# Patient Record
Sex: MALE | Race: WHITE | NOT HISPANIC OR LATINO | Employment: OTHER | URBAN - METROPOLITAN AREA
[De-identification: names, ages, dates, MRNs, and addresses within clinical notes are randomized per-mention and may not be internally consistent; named-entity substitution may affect disease eponyms.]

---

## 2020-01-09 ENCOUNTER — OFFICE VISIT (OUTPATIENT)
Dept: FAMILY MEDICINE | Facility: CLINIC | Age: 30
End: 2020-01-09
Payer: COMMERCIAL

## 2020-01-09 VITALS
HEART RATE: 76 BPM | TEMPERATURE: 98 F | WEIGHT: 193 LBS | BODY MASS INDEX: 26.14 KG/M2 | DIASTOLIC BLOOD PRESSURE: 84 MMHG | SYSTOLIC BLOOD PRESSURE: 138 MMHG | OXYGEN SATURATION: 99 % | HEIGHT: 72 IN

## 2020-01-09 DIAGNOSIS — M62.830 BACK MUSCLE SPASM: ICD-10-CM

## 2020-01-09 DIAGNOSIS — Z23 NEED FOR PROPHYLACTIC VACCINATION AND INOCULATION AGAINST INFLUENZA: ICD-10-CM

## 2020-01-09 DIAGNOSIS — M54.41 ACUTE RIGHT-SIDED LOW BACK PAIN WITH RIGHT-SIDED SCIATICA: Primary | ICD-10-CM

## 2020-01-09 PROCEDURE — 99203 OFFICE O/P NEW LOW 30 MIN: CPT | Mod: 25 | Performed by: NURSE PRACTITIONER

## 2020-01-09 PROCEDURE — 90686 IIV4 VACC NO PRSV 0.5 ML IM: CPT | Performed by: NURSE PRACTITIONER

## 2020-01-09 PROCEDURE — 90471 IMMUNIZATION ADMIN: CPT | Performed by: NURSE PRACTITIONER

## 2020-01-09 RX ORDER — METHYLPREDNISOLONE 4 MG
TABLET, DOSE PACK ORAL
Qty: 21 TABLET | Refills: 0 | Status: SHIPPED | OUTPATIENT
Start: 2020-01-09 | End: 2024-04-05

## 2020-01-09 RX ORDER — CYCLOBENZAPRINE HCL 5 MG
5-10 TABLET ORAL 3 TIMES DAILY PRN
Qty: 20 TABLET | Refills: 1 | Status: SHIPPED | OUTPATIENT
Start: 2020-01-09 | End: 2024-04-05

## 2020-01-09 ASSESSMENT — MIFFLIN-ST. JEOR: SCORE: 1863.44

## 2020-01-09 NOTE — PROGRESS NOTES
Subjective     Swapnil Angelo is a 30 year old male who presents to clinic today for the following health issues:    HPI   Back Pain       Duration: 3 weeks         Specific cause: none    Description:   Location of pain: low back right  Character of pain: sharp and dull   Pain radiation:radiates into the right buttocks and radiates into the right leg  New numbness or weakness in legs, not attributed to pain:  YES- sometimes in the heel     Intensity: starts off in the am, gets worse throughout the day     History:   Pain interferes with job: No  History of back problems: recurrent self limited episodes of low back pain in the past  Any previous MRI or X-rays: None  Sees a specialist for back pain:  No  Therapies tried without relief: stretching     Alleviating factors:   Improved by: NSAIDs  (somewhat)     Precipitating factors:  Worsened by: Lifting, Bending, Standing and Walking      Accompanying Signs & Symptoms:  Risk of Fracture:  None  Risk of Cauda Equina:  None  Risk of Infection:  None  Risk of Cancer:  None  Risk of Ankylosing Spondylitis:  Onset at age <35, male, AND morning back stiffness. no     HPI: Swapnil presents today with the complaint of low back pain. This has been present and ongoing for the past 3 weeks with no sign of improvement. No notable injury or overuse pattern. Constant but gets worse as the day goes on. Located in low back (right side) with radiation down into right buttocks and down back of right leg. Some tingling down right leg at times. No saddle anesthesia or limb weakness. No bowel or bladder dysfunction.     There is no problem list on file for this patient.    History reviewed. No pertinent surgical history.    Social History     Tobacco Use     Smoking status: Never Smoker     Smokeless tobacco: Never Used   Substance Use Topics     Alcohol use: Yes     Family History   Problem Relation Age of Onset     Cancer Father      Lung Cancer Maternal Grandmother             Reviewed and updated as needed this visit by Provider  Tobacco  Allergies  Meds  Problems  Med Hx  Surg Hx  Fam Hx  Soc Hx          Review of Systems   ROS COMP: Constitutional, musculoskeletal, neuro systems are negative, except as otherwise noted.      Objective    /84   Pulse 76   Temp 98  F (36.7  C) (Tympanic)   Ht 1.829 m (6')   Wt 87.5 kg (193 lb)   SpO2 99%   BMI 26.18 kg/m    Body mass index is 26.18 kg/m .  Physical Exam   GENERAL: healthy, alert and no distress  NEURO: Normal strength and tone, mentation intact and speech normal  BACK: Tender myofascial knots paraspinally in right lumbosacral region; No overlying heat, tenderness, swelling; No bony tenderness; No obvious malalignment. ROM intact but pain elicited with bending, twisting, and going from sitting to standing.     Diagnostic Test Results:  none         Assessment & Plan     Swapnil was seen today for back pain and imm/inj. No red flags. Imaging deferred today. Likely acute lumbosacral strain with muscle spasm and radicular symptoms. Will treat with alternation of ice and heat, ibuprofen, Flexeril, Medrol Dosepak, and gentle stretch / ROM (handout provided). If no improvement noted in 3-4 weeks, he will reach out and we can talk about sports medicine / medical spine referral +/- formal PT. Discussed reasons to call or return to clinic. Swapnil acknowledges and demonstrates understanding of circumstances under which care should be sought urgently or emergently. Follow up as discussed. Discussed risks, benefits, alternatives, potential side effects, and proper administration of new medication / treatment. Agrees with plan of care. All questions answered.     Diagnoses and all orders for this visit:    Acute right-sided low back pain with right-sided sciatica  -     methylPREDNISolone (MEDROL DOSEPAK) 4 MG tablet therapy pack; Follow Package Directions    Back muscle spasm  -     cyclobenzaprine (FLEXERIL) 5 MG tablet;  Take 1-2 tablets (5-10 mg) by mouth 3 times daily as needed for muscle spasms    Need for prophylactic vaccination and inoculation against influenza  -     INFLUENZA VACCINE IM > 6 MONTHS VALENT IIV4 [41794]  -     Vaccine Administration, Initial [45482]         BMI:   Estimated body mass index is 26.18 kg/m  as calculated from the following:    Height as of this encounter: 1.829 m (6').    Weight as of this encounter: 87.5 kg (193 lb).   Weight management plan: Discussed healthy diet and exercise guidelines        See Patient Instructions    Return in about 4 weeks (around 2/6/2020) for persistent or worsening symptoms.    Reji Henderson NP  [facility] [place]

## 2020-01-09 NOTE — PATIENT INSTRUCTIONS
Low back pain:  No need for imaging at this point (unless you have red flag symptoms).  Keep active, as able. Resting a sore back too much can delay recovery.  Ibuprofen for pain and inflammation. 800 mg twice a day.   Ice for pain. Heat to relieve spasm and tightness. Alternate ice and heat 3 times a day.   Flexeril (muscle relaxant). No driving.   Medrol Dosepak  Call or return to clinic if your symptoms persist for 6 weeks, or if they worsen.    Patient Education     Exercises to Strengthen Your Lower Back  Strong lower back and abdominal muscles work together to support your spine. The exercises below will help strengthen the lower back. It is important that you begin exercising slowly and increase levels gradually.  Always begin any exercise program with stretching. If you feel pain while doing any of these exercises, stop and talk to your doctor about a more specific exercise program that better suits your condition.   Low back stretch  The point of stretching is to make you more flexible and increase your range of motion. Stretch only as much as you are able. Stretch slowly. Do not push your stretch to the limit. If at any point you feel pain while stretching, this is your (temporary) limit.    Lie on your back with your knees bent and both feet on the ground.    Slowly raise your left knee to your chest as you flatten your lower back against the floor. Hold for 5 seconds.    Relax and repeat the exercise with your right knee.    Do 10 of these exercises for each leg.    Repeat hugging both knees to your chest at the same time.  Building lower back strength  Start your exercise routine with 10 to 30 minutes a day, 1 to 3 times a day.  Initial exercises  Lying on your back:  1. Ankle pumps: Move your foot up and down, towards your head, and then away. Repeat 10 times with each foot.  2. Heel slides: Slowly bend your knee, drawing the heel of your foot towards you. Then slide your heel/foot from you,  straightening your knee. Do not lift your foot off the floor (this is not a leg lift).  3. Abdominal contraction: Bend your knees and put your hands on your stomach. Tighten your stomach muscles. Hold for 5 seconds, then relax. Repeat 10 times.  4. Straight leg raise: Bend one leg at the knee and keep the other leg straight. Tighten your stomach muscles. Slowly lift your straight leg 6 to 12 inches off the floor and hold for up to 5 seconds. Repeat 10 times on each side.  Standin. Wall squats: Stand with your back against the wall. Move your feet about 12 inches away from the wall. Tighten your stomach muscles, and slowly bend your knees until they are at about a 45 degree angle. Do not go down too far. Hold about 5 seconds. Then slowly return to your starting position. Repeat 10 times.  2. Heel raises: Stand facing the wall. Slowly raise the heels of your feet up and down, while keeping your toes on the floor. If you have trouble balancing, you can touch the wall with your hands. Repeat 10 times.  More advanced exercises  When you feel comfortable enough, try these exercises.  1. Kneeling lumbar extension: Begin on your hands and knees. At the same time, raise and straighten your right arm and left leg until they are parallel to the ground. Hold for 2 seconds and come back slowly to a starting position. Repeat with left arm and right leg, alternating 10 times.  2. Prone lumbar extension: Lie face down, arms extended overhead, palms on the floor. At the same time, raise your right arm and left leg as high as comfortably possible. Hold for 10 seconds and slowly return to start. Repeat with left arm and right leg, alternating 10 times. Gradually build up to 20 times. (Advanced: Repeat this exercise raising both arms and both legs a few inches off the floor at the same time. Hold for 5 seconds and release.)  3. Pelvic tilt: Lie on the floor on your back with your knees bent at 90 degrees. Your feet should be flat  on the floor. Inhale, exhale, then slowly contract your abdominal muscles bringing your navel toward your spine. Let your pelvis rock back until your lower back is flat on the floor. Hold for 10 seconds while breathing smoothly.  4. Abdominal crunch: Perform a pelvic tilt (above) flattening your lower back against the floor. Holding the tension in your abdominal muscles, take another breath and raise your shoulder blades off the ground (this is not a full sit-up). Keep your head in line with your body (don t bend your neck forward). Hold for 2 seconds, then slowly lower.  Date Last Reviewed: 6/1/2016 2000-2019 [employer]. [address] [place] [address], [place] [address]. All rights reserved. This information is not intended as a substitute for professional medical care. Always follow your healthcare professional's instructions.           Patient Education     Back Care Tips    Caring for your back  These are things you can do to prevent a recurrence of acute back pain and to reduce symptoms from chronic back pain:    Stay at a healthy weight. If you are overweight, losing weight will help most types of back pain.    Exercise is an important part of recovery from most types of back pain. The muscles behind and in front of the spine support the back. This means strengthening both the back muscles and the abdominal muscles will provide better support for your spine.     Swimming and brisk walking are good overall exercises to improve your fitness level.    Practice safe lifting methods (see below).    Practice good posture when sitting, standing, and walking. Don't sit for a long time. This puts more stress on the lower back than standing or walking.    Wear quality shoes with good arch support. Foot and ankle alignment can affect back symptoms. Don't wear high heels.    Therapeutic massage can help relax the back muscles without stretching them.    During the first 24 to 72 hours after an acute injury or  flare-up of chronic back pain, put an ice pack on the painful area for 20 minutes and then remove it for 20 minutes. Do thisover a period of 60 to 90 minutes, or several times a day. As a safety precaution, don't use a heating pad at bedtime. Sleeping on a heating pad can lead to skin burns or tissue damage.    You can alternate using ice and heat.  Medicines  Talk with your healthcare provider before using medicines, especially if you have other health problems or are taking other medicines.    You may use over-the-counter medicines, such as acetaminophen, ibuprofen, or naprosyn to control pain, unless your healthcare provider prescribed other pain medicine. Talk with your healthcare provider before taking any medicines if you have a chronic condition such ass diabetes, liver or kidney disease, stomach ulcers, or digestive bleeding, or are taking blood thinners.    Be careful if you are given prescription pain medicines, opioids, or medicine for muscle spasm. They can cause drowsiness, and affect your coordination, reflexes, and judgment. Don't drive or operate heavy machinery while taking these types of medicines. Take prescription pain medicine only as prescribed by your healthcare provider.  Lumbar stretch  This simple stretch will help relax muscle spasm and keep your back more limber. If exercise makes your back pain worse, don t do it.    Lie on your back with your knees bent and both feet on the ground.    Slowly raise your left knee to your chest as you flatten your lower back against the floor. Hold for 5 seconds.    Relax and repeat the exercise with your right knee.    Do 10 of these exercises for each leg.  Safe lifting method    Don t bend over at the waist to lift an object off the floor.  Instead, bend your knees and hips in a squat.     Keep your back and head upright    Hold the object close to your body, directly in front of you.    Straighten your legs to lift the object.     Lower the object to  the floor in the reverse fashion.    If you must slide something across the floor, push it.    Posture tips  Sitting  Sit in chairs with straight backs or low-back support. Keep your knees lower than your hips, with your feet flat on the floor.  When driving, sit up straight. Adjust the seat forward so you are not leaning toward the steering wheel.  A small pillow or rolled towel behind your lower back may help if you are driving long distances.   Standing  When standing for long periods, shift most of your weight to one leg at a time. Switch legs every few minutes.   Sleeping  The best way to sleep is on your side with your knees bent. Put a low pillow under your head to support your neck in a neutral spine position. Don't use thick pillows that bend your neck to one side. Put a pillow between your legs to further relax your lower back. If you sleep on your back, put pillows under your knees to support your legs in a slightly flexed position. Use a firm mattress. If your mattress sags, replace it, or use a 1/2-inch plywood board under the mattress to add support.  Follow-up care  Follow up with your healthcare provider, or as advised.  If X-rays, a CT scan or an MRI scan were taken, they may be reviewed by a radiologist. You will be told of any new findings that may affect your care.  Call 911  Call 911 if any of the following occur:    Trouble breathing    Confusion    Very drowsy    Fainting or loss of consciousness    Rapid or very slow heart rate    Loss of  bowel or bladder control  When to seek medical advice  Call your healthcare provider right away if any of the following occur:    Pain becomes worse or spreads to your arms or legs    Weakness or numbness in one or both arms or legs    Numbness in the groin area  Date Last Reviewed: 6/1/2016 2000-2019 [employer]. [address] [facility], [place] [address]. All rights reserved. This information is not intended as a substitute for  professional medical care. Always follow your healthcare professional's instructions.

## 2020-03-16 ENCOUNTER — TELEPHONE (OUTPATIENT)
Dept: FAMILY MEDICINE | Facility: CLINIC | Age: 30
End: 2020-03-16

## 2020-03-16 DIAGNOSIS — M54.41 ACUTE RIGHT-SIDED LOW BACK PAIN WITH RIGHT-SIDED SCIATICA: Primary | ICD-10-CM

## 2020-03-16 NOTE — TELEPHONE ENCOUNTER
Thank you.     Please relate the following:    Sorry you're still having issues. At this point, I will go ahead and order a medical spine specialty referral (typically back pain episodes are nearly resolved by 6 weeks, so this has clearly extended beyond what is reasonable.     They should be calling him to set up a visit, but if they don't, here is the number:     All areas ~ [phone]    I will also order a PT referral, so if he wants to wait on the specialist appointment until he tries a month or so of PT, that would be fine, as well.     Here is the number to schedule with PT if he chooses to go that route.     Again, I'm sorry he's still struggling. Let me know if there is anything else I can do.     Thanks,  Timothy

## 2020-03-16 NOTE — TELEPHONE ENCOUNTER
Called patient and informed him of NP response below. Patient verbalized understanding and agrees with plan.   Referral information given to patient.     Mary Pineda RN, BSN  [facility] [place]

## 2020-03-16 NOTE — TELEPHONE ENCOUNTER
Reason for Call:   call back    Detailed comments: Pt has questions from his last appt. ZAKIYA/ [place] in Jan.  about his back - plz call back when poss.     Phone Number Patient can be reached at: Cell number on file:    Telephone Information:   Mobile [phone]       Best Time: any  Can we leave a detailed message on this number? Yes     Call taken on 3/16/2020 at 12:06 PM by Marycruz Gardner

## 2020-03-16 NOTE — TELEPHONE ENCOUNTER
Patient states about 25 % better. At night it gets pretty bad. Difficulty sleeping.     Patient has been stretching. Does seem to help. Also been walking.     Should he stop walking if he is feeling pain.     Taking ibuprofen 800 mg at dectime. Daytime, almost no pain. Worse as day goes on.     Feeling well otherwise. Should patient come in now or wait?   Brittany Sam RN

## 2020-04-13 ENCOUNTER — VIRTUAL VISIT (OUTPATIENT)
Dept: NEUROSURGERY | Facility: CLINIC | Age: 30
End: 2020-04-13
Attending: PHYSICIAN ASSISTANT
Payer: COMMERCIAL

## 2020-04-13 DIAGNOSIS — M54.50 CHRONIC RIGHT-SIDED LOW BACK PAIN WITHOUT SCIATICA: Primary | ICD-10-CM

## 2020-04-13 DIAGNOSIS — G89.29 CHRONIC RIGHT-SIDED LOW BACK PAIN WITHOUT SCIATICA: Primary | ICD-10-CM

## 2020-04-13 PROCEDURE — 99202 OFFICE O/P NEW SF 15 MIN: CPT | Mod: TEL | Performed by: PHYSICIAN ASSISTANT

## 2020-04-13 NOTE — PROGRESS NOTES
"Swapnil Angelo is a 30 year old male who is being evaluated via a billable telephone visit.      Due to COVID-19 this visit has been performed over the phone verses face to face.     The patient has been notified of following:     \"This telephone visit will be conducted via a call between you and your physician/provider. We have found that certain health care needs can be provided without the need for a physical exam.  This service lets us provide the care you need with a short phone conversation.  If a prescription is necessary we can send it directly to your pharmacy.  If lab work is needed we can place an order for that and you can then stop by our lab to have the test done at a later time.    If during the course of the call the physician/provider feels a telephone visit is not appropriate, you will not be charged for this service.\"     Swapnil Angelo complains of    Chief Complaint   Patient presents with     Neurologic Problem     Right sided low back pain with right sided sciatica        I have reviewed and updated the patient's Past Medical History, Social History, Family History and Medication List.    ALLERGIES  Patient has no known allergies.    Additional provider notes:    Swapnil Angelo is a 30 year old male who was referred to us by his primary care provider for evaluation of low back pain.  Patient states his back pain is been going on for about a year and he was initially concerned because it was not getting much better but now it has actually been improving significantly over the past 4 weeks, which is been released for him.  He reports it is mostly in his right low back occasionally into his right buttock.  He does not know of any particular injury that he feels brought in on for perhaps thinks may have started when he is playing golf.  He denies any weakness or numbness in his lower extremities denies any bowel or bladder issues.    Assessment    Right-sided low back pain    Plan:    I " reassured the patient that his symptoms sound entirely benign at this time and that he can certainly resume his usual activities without restrictions and some bruising to his body on how he does.  If he were to develop any concerning symptoms such as weakness numbness tingling or pain going down his legs would certainly be happy to see him again otherwise at this time no further follow-up is needed with neurosurgery clinic.    Phone call duration: 8 minutes  Start Time 125pm  End Time 133pm    Shayna Hector PA-C    Patient provided verbal consent for the phone visit today.

## 2021-01-04 ENCOUNTER — HEALTH MAINTENANCE LETTER (OUTPATIENT)
Age: 31
End: 2021-01-04

## 2021-10-10 ENCOUNTER — HEALTH MAINTENANCE LETTER (OUTPATIENT)
Age: 31
End: 2021-10-10

## 2022-01-30 ENCOUNTER — HEALTH MAINTENANCE LETTER (OUTPATIENT)
Age: 32
End: 2022-01-30

## 2022-09-18 ENCOUNTER — HEALTH MAINTENANCE LETTER (OUTPATIENT)
Age: 32
End: 2022-09-18

## 2023-05-07 ENCOUNTER — HEALTH MAINTENANCE LETTER (OUTPATIENT)
Age: 33
End: 2023-05-07

## 2024-01-10 ENCOUNTER — VIRTUAL VISIT (OUTPATIENT)
Dept: FAMILY MEDICINE | Facility: CLINIC | Age: 34
End: 2024-01-10
Payer: COMMERCIAL

## 2024-01-10 DIAGNOSIS — R41.840 INATTENTION: Primary | ICD-10-CM

## 2024-01-10 PROCEDURE — 99203 OFFICE O/P NEW LOW 30 MIN: CPT | Mod: 95 | Performed by: FAMILY MEDICINE

## 2024-01-10 NOTE — PROGRESS NOTES
Swapnil is a 34 year old who is being evaluated via a billable video visit.      How would you like to obtain your AVS? MyChart  If the video visit is dropped, the invitation should be resent by: Text to cell phone: [phone]  Will anyone else be joining your video visit? No          Assessment & Plan     Inattention  Suggested patient there is a good possibility this could be underlying ADD.  I suggested for adult diagnosis we need to have a proper evaluation and diagnosis I will refer him to mental health for possible testing and once that is done if it is indicate any such issues which can follow-up and discuss that.  Patient agreed with the plan.  - REVIEW OF HEALTH MAINTENANCE PROTOCOL ORDERS  - Adult Mental Health  Referral; Future      Shaun Aguilar MD  [facility]    Inga Kraft is a 34 year old, presenting for the following health issues:  Consult  Patient is a pleasant 34-year-old male who made this appointment to discuss some issues he is having symptoms of inattention and inability to to finish task.  As per patient he has always had this issue but never seek any treatment or evaluation.  He was able to finish college but he did struggle in some subjects.  Never been formally diagnosed with any ADHD or ADD.  He would like to get a referral for testing and then follow-up if medications are necessary.      1/10/2024     1:48 PM   Additional Questions   Roomed by Carmen   Accompanied by Not applicable, by themselves       History of Present Illness       Reason for visit:  I would like to be tested for ADD. I'm struggling with some sy,ptoms that have been getting worse.    He eats 0-1 servings of fruits and vegetables daily.He consumes 2 sweetened beverage(s) daily.He exercises with enough effort to increase his heart rate 10 to 19 minutes per day.  He exercises with enough effort to increase his heart rate 3 or less days per week.   He is taking medications  regularly.           Review of Systems   Constitutional, HEENT, cardiovascular, pulmonary, gi and gu systems are negative, except as otherwise noted.      Objective           Vitals:  No vitals were obtained today due to virtual visit.    Physical Exam   GENERAL: Healthy, alert and no distress  EYES: Eyes grossly normal to inspection.  No discharge or erythema, or obvious scleral/conjunctival abnormalities.  RESP: No audible wheeze, cough, or visible cyanosis.  No visible retractions or increased work of breathing.    SKIN: Visible skin clear. No significant rash, abnormal pigmentation or lesions.  NEURO: Cranial nerves grossly intact.  Mentation and speech appropriate for age.  PSYCH: Mentation appears normal, affect normal/bright, judgement and insight intact, normal speech and appearance well-groomed.                Video-Visit Details    Type of service:  Video Visit     Originating Location (pt. Location): Home    Distant Location (provider location):  On-site  Platform used for Video Visit: [employer] Mobile

## 2024-02-07 ENCOUNTER — TRANSFERRED RECORDS (OUTPATIENT)
Dept: HEALTH INFORMATION MANAGEMENT | Facility: CLINIC | Age: 34
End: 2024-02-07
Payer: COMMERCIAL

## 2024-02-27 ENCOUNTER — MYC MEDICAL ADVICE (OUTPATIENT)
Dept: FAMILY MEDICINE | Facility: CLINIC | Age: 34
End: 2024-02-27
Payer: COMMERCIAL

## 2024-02-27 NOTE — TELEPHONE ENCOUNTER
Please help patient's schedule an in person evaluation and review of his evaluation in the next 1 to 2 weeks.  Okay to use same-day slot.

## 2024-02-28 NOTE — TELEPHONE ENCOUNTER
Call attempt #1.       Left message on secure voicemail.  Patient told to call and schedule appointment with Dr. Aguilar. If they are unable to help him secure appointment in two weeks, he should ask to be transferred to a triage nurse.       Megan Luna RN  [facility]

## 2024-03-05 ENCOUNTER — OFFICE VISIT (OUTPATIENT)
Dept: FAMILY MEDICINE | Facility: CLINIC | Age: 34
End: 2024-03-05
Payer: COMMERCIAL

## 2024-03-05 VITALS
DIASTOLIC BLOOD PRESSURE: 84 MMHG | OXYGEN SATURATION: 98 % | HEART RATE: 84 BPM | RESPIRATION RATE: 12 BRPM | BODY MASS INDEX: 27.12 KG/M2 | TEMPERATURE: 99.1 F | SYSTOLIC BLOOD PRESSURE: 120 MMHG | WEIGHT: 200 LBS

## 2024-03-05 DIAGNOSIS — F90.9 ATTENTION DEFICIT HYPERACTIVITY DISORDER (ADHD), UNSPECIFIED ADHD TYPE: Primary | ICD-10-CM

## 2024-03-05 PROCEDURE — 99214 OFFICE O/P EST MOD 30 MIN: CPT | Performed by: FAMILY MEDICINE

## 2024-03-05 PROCEDURE — 80053 COMPREHEN METABOLIC PANEL: CPT | Performed by: FAMILY MEDICINE

## 2024-03-05 PROCEDURE — 36415 COLL VENOUS BLD VENIPUNCTURE: CPT | Performed by: FAMILY MEDICINE

## 2024-03-05 RX ORDER — DEXTROAMPHETAMINE SACCHARATE, AMPHETAMINE ASPARTATE MONOHYDRATE, DEXTROAMPHETAMINE SULFATE AND AMPHETAMINE SULFATE 2.5; 2.5; 2.5; 2.5 MG/1; MG/1; MG/1; MG/1
10 CAPSULE, EXTENDED RELEASE ORAL DAILY
Qty: 30 CAPSULE | Refills: 0 | Status: SHIPPED | OUTPATIENT
Start: 2024-03-05 | End: 2024-04-05

## 2024-03-05 ASSESSMENT — PAIN SCALES - GENERAL: PAINLEVEL: NO PAIN (0)

## 2024-03-05 NOTE — PROGRESS NOTES
Assessment & Plan     Attention deficit hyperactivity disorder (ADHD), unspecified ADHD type  Discussed with the patient about the diagnosis.  Treatment is often with medication which is controlled substance.  Side effects and long-term effects were discussed with the patient.  After careful discussion we decided to start on a low-dose of Adderall.  Side effects including sleep, palpitations and other issues were discussed.  He will take this medication as prescribed advised not to take later part of the day.  Follow-up in 4 weeks for reevaluation.    - Comprehensive metabolic panel (BMP + Alb, Alk Phos, ALT, AST, Total. Bili, TP); Future  - amphetamine-dextroamphetamine (ADDERALL XR) 10 MG 24 hr capsule; Take 1 capsule (10 mg) by mouth daily  - Comprehensive metabolic panel (BMP + Alb, Alk Phos, ALT, AST, Total. Bili, TP)                Inga Kraft is a 34 year old, presenting for the following health issues:  Consult (ADHD)  Patient came today for evaluation of ADHD.  He recently have evaluation done which indicates he have mild to moderate ADHD symptoms.  He has been having issues regarding inattention, pain attention to the details is able to finish work has always been a challenging.  He has never been on any medication.  Childhood was somewhat similar in terms of finishing task.  However he never was formally diagnosed or given any medication for that.      3/5/2024    12:45 PM   Additional Questions   Roomed by Dennis WREN     History of Present Illness       Reason for visit:  ADHD diagnosis follow-up    He eats 2-3 servings of fruits and vegetables daily.He consumes 1 sweetened beverage(s) daily.He exercises with enough effort to increase his heart rate 10 to 19 minutes per day.  He exercises with enough effort to increase his heart rate 3 or less days per week.   He is taking medications regularly.             Review of Systems  Constitutional, HEENT, cardiovascular, pulmonary, gi and gu systems are  negative, except as otherwise noted.      Objective    /84   Pulse 84   Temp 99.1  F (37.3  C) (Tympanic)   Resp 12   Wt 90.7 kg (200 lb)   SpO2 98%   BMI 27.12 kg/m    Body mass index is 27.12 kg/m .  Physical Exam   GENERAL: alert and no distress  NECK: no adenopathy, no asymmetry, masses, or scars  RESP: lungs clear to auscultation - no rales, rhonchi or wheezes  CV: regular rate and rhythm, normal S1 S2, no S3 or S4, no murmur, click or rub, no peripheral edema  ABDOMEN: soft, nontender, no hepatosplenomegaly, no masses and bowel sounds normal  MS: no gross musculoskeletal defects noted, no edema          Signed Electronically by: Shaun Aguilar MD

## 2024-03-06 LAB
ALBUMIN SERPL BCG-MCNC: 5 G/DL (ref 3.5–5.2)
ALP SERPL-CCNC: 64 U/L (ref 40–150)
ALT SERPL W P-5'-P-CCNC: 23 U/L (ref 0–70)
ANION GAP SERPL CALCULATED.3IONS-SCNC: 13 MMOL/L (ref 7–15)
AST SERPL W P-5'-P-CCNC: 33 U/L (ref 0–45)
BILIRUB SERPL-MCNC: 1.9 MG/DL
BUN SERPL-MCNC: 11.3 MG/DL (ref 6–20)
CALCIUM SERPL-MCNC: 9.9 MG/DL (ref 8.6–10)
CHLORIDE SERPL-SCNC: 103 MMOL/L (ref 98–107)
CREAT SERPL-MCNC: 0.88 MG/DL (ref 0.67–1.17)
DEPRECATED HCO3 PLAS-SCNC: 25 MMOL/L (ref 22–29)
EGFRCR SERPLBLD CKD-EPI 2021: >90 ML/MIN/1.73M2
GLUCOSE SERPL-MCNC: 106 MG/DL (ref 70–99)
POTASSIUM SERPL-SCNC: 4.5 MMOL/L (ref 3.4–5.3)
PROT SERPL-MCNC: 7.7 G/DL (ref 6.4–8.3)
SODIUM SERPL-SCNC: 141 MMOL/L (ref 135–145)

## 2024-04-05 ENCOUNTER — VIRTUAL VISIT (OUTPATIENT)
Dept: FAMILY MEDICINE | Facility: CLINIC | Age: 34
End: 2024-04-05
Payer: COMMERCIAL

## 2024-04-05 DIAGNOSIS — F90.9 ATTENTION DEFICIT HYPERACTIVITY DISORDER (ADHD), UNSPECIFIED ADHD TYPE: ICD-10-CM

## 2024-04-05 PROCEDURE — 99213 OFFICE O/P EST LOW 20 MIN: CPT | Mod: 95 | Performed by: FAMILY MEDICINE

## 2024-04-05 RX ORDER — DEXTROAMPHETAMINE SACCHARATE, AMPHETAMINE ASPARTATE MONOHYDRATE, DEXTROAMPHETAMINE SULFATE AND AMPHETAMINE SULFATE 2.5; 2.5; 2.5; 2.5 MG/1; MG/1; MG/1; MG/1
10 CAPSULE, EXTENDED RELEASE ORAL DAILY
Qty: 30 CAPSULE | Refills: 0 | Status: SHIPPED | OUTPATIENT
Start: 2024-04-05 | End: 2024-05-08

## 2024-04-05 NOTE — PROGRESS NOTES
Swapnil is a 34 year old who is being evaluated via a billable video visit.    How would you like to obtain your AVS? MyChart  If the video visit is dropped, the invitation should be resent by: Text to cell phone: [phone]  Will anyone else be joining your video visit? No      Assessment & Plan     Attention deficit hyperactivity disorder (ADHD), unspecified ADHD type  Follow up in 6 months, refill protocol dicussed with the patient.  - amphetamine-dextroamphetamine (ADDERALL XR) 10 MG 24 hr capsule; Take 1 capsule (10 mg) by mouth daily              Subjective   Swapnil is a 34 year old, presenting for the following health issues:  Recheck Medication and Refill Request  Patient is for follow-up on ADHD symptoms.  Since starting Adderall he noticed significant improvement in his inattention.  He is able to finish tasks.  No current side effects he would like to get her medication refilled.  History of Present Illness       Reason for visit:  Check-in, prescription refill    He eats 2-3 servings of fruits and vegetables daily.He consumes 2 sweetened beverage(s) daily.He exercises with enough effort to increase his heart rate 30 to 60 minutes per day.  He exercises with enough effort to increase his heart rate 3 or less days per week. He is missing 2 dose(s) of medications per week.  He is not taking prescribed medications regularly due to other.           Review of Systems  Constitutional, HEENT, cardiovascular, pulmonary, gi and gu systems are negative, except as otherwise noted.      Objective    Vitals - Patient Reported  Weight (Patient Reported): 90.7 kg (200 lb)  Height (Patient Reported): 182.9 cm (6')  BMI (Based on Pt Reported Ht/Wt): 27.12        Physical Exam   GENERAL: alert and no distress  EYES: Eyes grossly normal to inspection.  No discharge or erythema, or obvious scleral/conjunctival abnormalities.  RESP: No audible wheeze, cough, or visible cyanosis.    SKIN: Visible skin clear. No significant  rash, abnormal pigmentation or lesions.  NEURO: Cranial nerves grossly intact.  Mentation and speech appropriate for age.  PSYCH: Appropriate affect, tone, and pace of words          Video-Visit Details    Type of service:  Video Visit   Originating Location (pt. Location): Home    Distant Location (provider location):  On-site  Platform used for Video Visit: Parveen  Signed Electronically by: Shaun Aguilar MD

## 2024-05-08 ENCOUNTER — MYC REFILL (OUTPATIENT)
Dept: FAMILY MEDICINE | Facility: CLINIC | Age: 34
End: 2024-05-08
Payer: COMMERCIAL

## 2024-05-08 DIAGNOSIS — F90.9 ATTENTION DEFICIT HYPERACTIVITY DISORDER (ADHD), UNSPECIFIED ADHD TYPE: ICD-10-CM

## 2024-05-08 RX ORDER — DEXTROAMPHETAMINE SACCHARATE, AMPHETAMINE ASPARTATE MONOHYDRATE, DEXTROAMPHETAMINE SULFATE AND AMPHETAMINE SULFATE 2.5; 2.5; 2.5; 2.5 MG/1; MG/1; MG/1; MG/1
10 CAPSULE, EXTENDED RELEASE ORAL DAILY
Qty: 30 CAPSULE | Refills: 0 | Status: SHIPPED | OUTPATIENT
Start: 2024-05-08 | End: 2024-06-05

## 2024-06-05 ENCOUNTER — MYC REFILL (OUTPATIENT)
Dept: FAMILY MEDICINE | Facility: CLINIC | Age: 34
End: 2024-06-05
Payer: COMMERCIAL

## 2024-06-05 DIAGNOSIS — F90.9 ATTENTION DEFICIT HYPERACTIVITY DISORDER (ADHD), UNSPECIFIED ADHD TYPE: ICD-10-CM

## 2024-06-05 RX ORDER — DEXTROAMPHETAMINE SACCHARATE, AMPHETAMINE ASPARTATE MONOHYDRATE, DEXTROAMPHETAMINE SULFATE AND AMPHETAMINE SULFATE 2.5; 2.5; 2.5; 2.5 MG/1; MG/1; MG/1; MG/1
10 CAPSULE, EXTENDED RELEASE ORAL DAILY
Qty: 30 CAPSULE | Refills: 0 | Status: SHIPPED | OUTPATIENT
Start: 2024-06-05 | End: 2024-07-03

## 2024-07-03 ENCOUNTER — MYC REFILL (OUTPATIENT)
Dept: FAMILY MEDICINE | Facility: CLINIC | Age: 34
End: 2024-07-03
Payer: COMMERCIAL

## 2024-07-03 DIAGNOSIS — F90.9 ATTENTION DEFICIT HYPERACTIVITY DISORDER (ADHD), UNSPECIFIED ADHD TYPE: ICD-10-CM

## 2024-07-03 RX ORDER — DEXTROAMPHETAMINE SACCHARATE, AMPHETAMINE ASPARTATE MONOHYDRATE, DEXTROAMPHETAMINE SULFATE AND AMPHETAMINE SULFATE 2.5; 2.5; 2.5; 2.5 MG/1; MG/1; MG/1; MG/1
10 CAPSULE, EXTENDED RELEASE ORAL DAILY
Qty: 30 CAPSULE | Refills: 0 | Status: SHIPPED | OUTPATIENT
Start: 2024-07-03 | End: 2024-08-05

## 2024-07-14 ENCOUNTER — HEALTH MAINTENANCE LETTER (OUTPATIENT)
Age: 34
End: 2024-07-14

## 2024-08-05 ENCOUNTER — MYC REFILL (OUTPATIENT)
Dept: FAMILY MEDICINE | Facility: CLINIC | Age: 34
End: 2024-08-05
Payer: COMMERCIAL

## 2024-08-05 DIAGNOSIS — F90.9 ATTENTION DEFICIT HYPERACTIVITY DISORDER (ADHD), UNSPECIFIED ADHD TYPE: ICD-10-CM

## 2024-08-06 RX ORDER — DEXTROAMPHETAMINE SACCHARATE, AMPHETAMINE ASPARTATE MONOHYDRATE, DEXTROAMPHETAMINE SULFATE AND AMPHETAMINE SULFATE 2.5; 2.5; 2.5; 2.5 MG/1; MG/1; MG/1; MG/1
10 CAPSULE, EXTENDED RELEASE ORAL DAILY
Qty: 30 CAPSULE | Refills: 0 | Status: SHIPPED | OUTPATIENT
Start: 2024-08-06 | End: 2024-09-05

## 2024-09-05 ENCOUNTER — MYC REFILL (OUTPATIENT)
Dept: FAMILY MEDICINE | Facility: CLINIC | Age: 34
End: 2024-09-05
Payer: COMMERCIAL

## 2024-09-05 DIAGNOSIS — F90.9 ATTENTION DEFICIT HYPERACTIVITY DISORDER (ADHD), UNSPECIFIED ADHD TYPE: ICD-10-CM

## 2024-09-05 RX ORDER — DEXTROAMPHETAMINE SACCHARATE, AMPHETAMINE ASPARTATE MONOHYDRATE, DEXTROAMPHETAMINE SULFATE AND AMPHETAMINE SULFATE 2.5; 2.5; 2.5; 2.5 MG/1; MG/1; MG/1; MG/1
10 CAPSULE, EXTENDED RELEASE ORAL DAILY
Qty: 30 CAPSULE | Refills: 0 | Status: SHIPPED | OUTPATIENT
Start: 2024-09-05 | End: 2024-10-03

## 2024-09-12 ENCOUNTER — VIRTUAL VISIT (OUTPATIENT)
Dept: FAMILY MEDICINE | Facility: CLINIC | Age: 34
End: 2024-09-12
Payer: COMMERCIAL

## 2024-09-12 DIAGNOSIS — R41.840 INATTENTION: ICD-10-CM

## 2024-09-12 DIAGNOSIS — F90.9 ATTENTION DEFICIT HYPERACTIVITY DISORDER (ADHD), UNSPECIFIED ADHD TYPE: Primary | ICD-10-CM

## 2024-09-12 PROCEDURE — G2211 COMPLEX E/M VISIT ADD ON: HCPCS | Mod: 95 | Performed by: FAMILY MEDICINE

## 2024-09-12 PROCEDURE — 99213 OFFICE O/P EST LOW 20 MIN: CPT | Mod: 95 | Performed by: FAMILY MEDICINE

## 2024-09-12 NOTE — PROGRESS NOTES
Swapnil is a 34 year old who is being evaluated via a billable video visit.    How would you like to obtain your AVS? MyChart  If the video visit is dropped, the invitation should be resent by: Send to e-mail at: [email or web address]  Will anyone else be joining your video visit? No      Assessment & Plan     Attention deficit hyperactivity disorder (ADHD), unspecified ADHD type  Is overall feeling better first dose.  Would like to continue that side effect profile discussed.  He thinks it may wear off in the end of the day however still feel he would like to continue same dose.  Follow-up 6 months in person.    Inattention      The longitudinal plan of care for the diagnosis(es)/condition(s) as documented were addressed during this visit. Due to the added complexity in care, I will continue to support Swapnil in the subsequent management and with ongoing continuity of care.              Subjective   Swapnil is a 34 year old, presenting for the following health issues:  Recheck Medication        9/12/2024    12:48 PM   Additional Questions   Roomed by Danay     History of Present Illness       Reason for visit:  Follow up visit    He eats 2-3 servings of fruits and vegetables daily.He consumes 1 sweetened beverage(s) daily.He exercises with enough effort to increase his heart rate 30 to 60 minutes per day.  He exercises with enough effort to increase his heart rate 5 days per week.        Medication Followup of Adderall Xr 10 MG  Taking Medication as prescribed: yes  Side Effects:  None  Medication Helping Symptoms:  yes        Review of Systems  Constitutional, HEENT, cardiovascular, pulmonary, gi and gu systems are negative, except as otherwise noted.      Objective    Vitals - Patient Reported  Pain Score: No Pain (0)      Vitals:  No vitals were obtained today due to virtual visit.    Physical Exam   GENERAL: alert and no distress  EYES: Eyes grossly normal to inspection.  No discharge or erythema, or obvious  scleral/conjunctival abnormalities.  RESP: No audible wheeze, cough, or visible cyanosis.    SKIN: Visible skin clear. No significant rash, abnormal pigmentation or lesions.  NEURO: Cranial nerves grossly intact.  Mentation and speech appropriate for age.  PSYCH: Appropriate affect, tone, and pace of words          Video-Visit Details    Type of service:  Video Visit   Originating Location (pt. Location): Home    Distant Location (provider location):  On-site  Platform used for Video Visit: Parveen  Signed Electronically by: Shaun Aguilar MD

## 2024-10-03 ENCOUNTER — MYC REFILL (OUTPATIENT)
Dept: FAMILY MEDICINE | Facility: CLINIC | Age: 34
End: 2024-10-03
Payer: COMMERCIAL

## 2024-10-03 DIAGNOSIS — F90.9 ATTENTION DEFICIT HYPERACTIVITY DISORDER (ADHD), UNSPECIFIED ADHD TYPE: ICD-10-CM

## 2024-10-03 RX ORDER — DEXTROAMPHETAMINE SACCHARATE, AMPHETAMINE ASPARTATE MONOHYDRATE, DEXTROAMPHETAMINE SULFATE AND AMPHETAMINE SULFATE 2.5; 2.5; 2.5; 2.5 MG/1; MG/1; MG/1; MG/1
10 CAPSULE, EXTENDED RELEASE ORAL DAILY
Qty: 30 CAPSULE | Refills: 0 | Status: SHIPPED | OUTPATIENT
Start: 2024-10-03 | End: 2024-11-06

## 2024-11-06 ENCOUNTER — MYC REFILL (OUTPATIENT)
Dept: FAMILY MEDICINE | Facility: CLINIC | Age: 34
End: 2024-11-06
Payer: COMMERCIAL

## 2024-11-06 DIAGNOSIS — F90.9 ATTENTION DEFICIT HYPERACTIVITY DISORDER (ADHD), UNSPECIFIED ADHD TYPE: ICD-10-CM

## 2024-11-06 RX ORDER — DEXTROAMPHETAMINE SACCHARATE, AMPHETAMINE ASPARTATE MONOHYDRATE, DEXTROAMPHETAMINE SULFATE AND AMPHETAMINE SULFATE 2.5; 2.5; 2.5; 2.5 MG/1; MG/1; MG/1; MG/1
10 CAPSULE, EXTENDED RELEASE ORAL DAILY
Qty: 30 CAPSULE | Refills: 0 | Status: SHIPPED | OUTPATIENT
Start: 2024-11-06

## 2024-12-05 ENCOUNTER — MYC REFILL (OUTPATIENT)
Dept: FAMILY MEDICINE | Facility: CLINIC | Age: 34
End: 2024-12-05
Payer: COMMERCIAL

## 2024-12-05 DIAGNOSIS — F90.9 ATTENTION DEFICIT HYPERACTIVITY DISORDER (ADHD), UNSPECIFIED ADHD TYPE: ICD-10-CM

## 2024-12-05 RX ORDER — DEXTROAMPHETAMINE SACCHARATE, AMPHETAMINE ASPARTATE MONOHYDRATE, DEXTROAMPHETAMINE SULFATE AND AMPHETAMINE SULFATE 2.5; 2.5; 2.5; 2.5 MG/1; MG/1; MG/1; MG/1
10 CAPSULE, EXTENDED RELEASE ORAL DAILY
Qty: 30 CAPSULE | Refills: 0 | Status: SHIPPED | OUTPATIENT
Start: 2024-12-05

## 2025-01-06 ENCOUNTER — MYC REFILL (OUTPATIENT)
Dept: FAMILY MEDICINE | Facility: CLINIC | Age: 35
End: 2025-01-06
Payer: COMMERCIAL

## 2025-01-06 DIAGNOSIS — F90.9 ATTENTION DEFICIT HYPERACTIVITY DISORDER (ADHD), UNSPECIFIED ADHD TYPE: ICD-10-CM

## 2025-01-06 RX ORDER — DEXTROAMPHETAMINE SACCHARATE, AMPHETAMINE ASPARTATE MONOHYDRATE, DEXTROAMPHETAMINE SULFATE AND AMPHETAMINE SULFATE 2.5; 2.5; 2.5; 2.5 MG/1; MG/1; MG/1; MG/1
10 CAPSULE, EXTENDED RELEASE ORAL DAILY
Qty: 30 CAPSULE | Refills: 0 | Status: SHIPPED | OUTPATIENT
Start: 2025-01-06

## 2025-02-05 ENCOUNTER — MYC REFILL (OUTPATIENT)
Dept: FAMILY MEDICINE | Facility: CLINIC | Age: 35
End: 2025-02-05
Payer: COMMERCIAL

## 2025-02-05 DIAGNOSIS — F90.9 ATTENTION DEFICIT HYPERACTIVITY DISORDER (ADHD), UNSPECIFIED ADHD TYPE: ICD-10-CM

## 2025-02-05 RX ORDER — DEXTROAMPHETAMINE SACCHARATE, AMPHETAMINE ASPARTATE MONOHYDRATE, DEXTROAMPHETAMINE SULFATE AND AMPHETAMINE SULFATE 2.5; 2.5; 2.5; 2.5 MG/1; MG/1; MG/1; MG/1
10 CAPSULE, EXTENDED RELEASE ORAL DAILY
Qty: 30 CAPSULE | Refills: 0 | Status: SHIPPED | OUTPATIENT
Start: 2025-02-05

## 2025-03-05 ENCOUNTER — MYC REFILL (OUTPATIENT)
Dept: FAMILY MEDICINE | Facility: CLINIC | Age: 35
End: 2025-03-05
Payer: COMMERCIAL

## 2025-03-05 DIAGNOSIS — F90.9 ATTENTION DEFICIT HYPERACTIVITY DISORDER (ADHD), UNSPECIFIED ADHD TYPE: ICD-10-CM

## 2025-03-05 RX ORDER — DEXTROAMPHETAMINE SACCHARATE, AMPHETAMINE ASPARTATE MONOHYDRATE, DEXTROAMPHETAMINE SULFATE AND AMPHETAMINE SULFATE 2.5; 2.5; 2.5; 2.5 MG/1; MG/1; MG/1; MG/1
10 CAPSULE, EXTENDED RELEASE ORAL DAILY
Qty: 30 CAPSULE | Refills: 0 | Status: SHIPPED | OUTPATIENT
Start: 2025-03-05

## 2025-04-07 ENCOUNTER — MYC REFILL (OUTPATIENT)
Dept: FAMILY MEDICINE | Facility: CLINIC | Age: 35
End: 2025-04-07
Payer: COMMERCIAL

## 2025-04-07 DIAGNOSIS — F90.9 ATTENTION DEFICIT HYPERACTIVITY DISORDER (ADHD), UNSPECIFIED ADHD TYPE: ICD-10-CM

## 2025-04-07 RX ORDER — DEXTROAMPHETAMINE SACCHARATE, AMPHETAMINE ASPARTATE MONOHYDRATE, DEXTROAMPHETAMINE SULFATE AND AMPHETAMINE SULFATE 2.5; 2.5; 2.5; 2.5 MG/1; MG/1; MG/1; MG/1
10 CAPSULE, EXTENDED RELEASE ORAL DAILY
Qty: 30 CAPSULE | Refills: 0 | Status: SHIPPED | OUTPATIENT
Start: 2025-04-07

## 2025-04-24 ENCOUNTER — OFFICE VISIT (OUTPATIENT)
Dept: FAMILY MEDICINE | Facility: CLINIC | Age: 35
End: 2025-04-24
Payer: COMMERCIAL

## 2025-04-24 VITALS
TEMPERATURE: 97.2 F | WEIGHT: 197 LBS | HEIGHT: 72 IN | HEART RATE: 72 BPM | SYSTOLIC BLOOD PRESSURE: 110 MMHG | BODY MASS INDEX: 26.68 KG/M2 | RESPIRATION RATE: 15 BRPM | OXYGEN SATURATION: 99 % | DIASTOLIC BLOOD PRESSURE: 60 MMHG

## 2025-04-24 DIAGNOSIS — Z13.220 LIPID SCREENING: ICD-10-CM

## 2025-04-24 DIAGNOSIS — F90.9 ATTENTION DEFICIT HYPERACTIVITY DISORDER (ADHD), UNSPECIFIED ADHD TYPE: Primary | ICD-10-CM

## 2025-04-24 RX ORDER — DEXTROAMPHETAMINE SACCHARATE, AMPHETAMINE ASPARTATE MONOHYDRATE, DEXTROAMPHETAMINE SULFATE AND AMPHETAMINE SULFATE 3.75; 3.75; 3.75; 3.75 MG/1; MG/1; MG/1; MG/1
15 CAPSULE, EXTENDED RELEASE ORAL DAILY
Qty: 30 CAPSULE | Refills: 0 | Status: SHIPPED | OUTPATIENT
Start: 2025-05-05

## 2025-04-24 ASSESSMENT — PAIN SCALES - GENERAL: PAINLEVEL_OUTOF10: NO PAIN (0)

## 2025-04-24 NOTE — PROGRESS NOTES
"  Assessment & Plan     Attention deficit hyperactivity disorder (ADHD), unspecified ADHD type  Suggested patient to increase the dose of Adderall to 15 mg extended release prescription provided.  Follow-up 6 months for recheck via virtual visit side effects were discussed with the patient. He denies nay palpitations or sleep issues  - PRIMARY CARE FOLLOW-UP SCHEDULING; Future  - amphetamine-dextroamphetamine (ADDERALL XR) 15 MG 24 hr capsule; Take 1 capsule (15 mg) by mouth daily.    Lipid screening    - Comprehensive metabolic panel; Future  - Lipid panel reflex to direct LDL Fasting; Future  - Comprehensive metabolic panel  - Lipid panel reflex to direct LDL Fasting      The longitudinal plan of care for the diagnosis(es)/condition(s) as documented were addressed during this visit. Due to the added complexity in care, I will continue to support Swapnil in the subsequent management and with ongoing continuity of care.      BMI  Estimated body mass index is 26.68 kg/m  as calculated from the following:    Height as of this encounter: 1.83 m (6' 0.05\").    Weight as of this encounter: 89.4 kg (197 lb).           Inga Kraft is a 35 year old, presenting for the following health issues:  Recheck Medication        4/24/2025     3:44 PM   Additional Questions   Roomed by Dennis WREN     History of Present Illness       Reason for visit:  Medication follow up    He eats 2-3 servings of fruits and vegetables daily.He consumes 1 sweetened beverage(s) daily.He exercises with enough effort to increase his heart rate 30 to 60 minutes per day.  He exercises with enough effort to increase his heart rate 5 days per week. He is missing 2 dose(s) of medications per week.  He is not taking prescribed medications regularly due to other.        Medication Followup of Adderall  Taking Medication as prescribed: Takes 5 days per week, no weekends   Side Effects:  None  Medication Helping Symptoms:  yes  Patient feels that medication " "is helping some time in the later part of the day he feels effects are less he is willing to go up on the dose ,he is currently taking low-dose 10 mg.      Review of Systems  Constitutional, HEENT, cardiovascular, pulmonary, gi and gu systems are negative, except as otherwise noted.      Objective    /60   Pulse 72   Temp 97.2  F (36.2  C) (Tympanic)   Resp 15   Ht 1.83 m (6' 0.05\")   Wt 89.4 kg (197 lb)   SpO2 99%   BMI 26.68 kg/m    Body mass index is 26.68 kg/m .  Physical Exam   GENERAL: alert and no distress  NECK: no adenopathy, no asymmetry, masses, or scars  RESP: lungs clear to auscultation - no rales, rhonchi or wheezes  CV: regular rate and rhythm, normal S1 S2, no S3 or S4, no murmur, click or rub, no peripheral edema  ABDOMEN: soft, nontender, no hepatosplenomegaly, no masses and bowel sounds normal  MS: no gross musculoskeletal defects noted, no edema            Signed Electronically by: Shaun Aguilar MD    "

## 2025-06-05 ENCOUNTER — MYC REFILL (OUTPATIENT)
Dept: FAMILY MEDICINE | Facility: CLINIC | Age: 35
End: 2025-06-05
Payer: COMMERCIAL

## 2025-06-05 DIAGNOSIS — F90.9 ATTENTION DEFICIT HYPERACTIVITY DISORDER (ADHD), UNSPECIFIED ADHD TYPE: ICD-10-CM

## 2025-06-05 RX ORDER — DEXTROAMPHETAMINE SACCHARATE, AMPHETAMINE ASPARTATE MONOHYDRATE, DEXTROAMPHETAMINE SULFATE AND AMPHETAMINE SULFATE 3.75; 3.75; 3.75; 3.75 MG/1; MG/1; MG/1; MG/1
15 CAPSULE, EXTENDED RELEASE ORAL DAILY
Qty: 30 CAPSULE | Refills: 0 | Status: SHIPPED | OUTPATIENT
Start: 2025-06-05

## 2025-07-07 ENCOUNTER — MYC REFILL (OUTPATIENT)
Dept: FAMILY MEDICINE | Facility: CLINIC | Age: 35
End: 2025-07-07
Payer: COMMERCIAL

## 2025-07-07 DIAGNOSIS — F90.9 ATTENTION DEFICIT HYPERACTIVITY DISORDER (ADHD), UNSPECIFIED ADHD TYPE: ICD-10-CM

## 2025-07-08 RX ORDER — DEXTROAMPHETAMINE SACCHARATE, AMPHETAMINE ASPARTATE MONOHYDRATE, DEXTROAMPHETAMINE SULFATE AND AMPHETAMINE SULFATE 3.75; 3.75; 3.75; 3.75 MG/1; MG/1; MG/1; MG/1
15 CAPSULE, EXTENDED RELEASE ORAL DAILY
Qty: 30 CAPSULE | Refills: 0 | Status: SHIPPED | OUTPATIENT
Start: 2025-07-08

## 2025-07-19 ENCOUNTER — HEALTH MAINTENANCE LETTER (OUTPATIENT)
Age: 35
End: 2025-07-19

## 2025-08-05 ENCOUNTER — MYC REFILL (OUTPATIENT)
Dept: FAMILY MEDICINE | Facility: CLINIC | Age: 35
End: 2025-08-05
Payer: COMMERCIAL

## 2025-08-05 DIAGNOSIS — F90.9 ATTENTION DEFICIT HYPERACTIVITY DISORDER (ADHD), UNSPECIFIED ADHD TYPE: ICD-10-CM

## 2025-08-05 RX ORDER — DEXTROAMPHETAMINE SACCHARATE, AMPHETAMINE ASPARTATE MONOHYDRATE, DEXTROAMPHETAMINE SULFATE AND AMPHETAMINE SULFATE 3.75; 3.75; 3.75; 3.75 MG/1; MG/1; MG/1; MG/1
15 CAPSULE, EXTENDED RELEASE ORAL DAILY
Qty: 30 CAPSULE | Refills: 0 | Status: SHIPPED | OUTPATIENT
Start: 2025-08-05